# Patient Record
Sex: FEMALE | URBAN - METROPOLITAN AREA
[De-identification: names, ages, dates, MRNs, and addresses within clinical notes are randomized per-mention and may not be internally consistent; named-entity substitution may affect disease eponyms.]

---

## 2023-06-28 ENCOUNTER — APPOINTMENT (RX ONLY)
Dept: URBAN - METROPOLITAN AREA CLINIC 145 | Facility: CLINIC | Age: 83
Setting detail: DERMATOLOGY
End: 2023-06-28

## 2023-06-28 PROBLEM — C44.311 BASAL CELL CARCINOMA OF SKIN OF NOSE: Status: ACTIVE | Noted: 2023-06-28

## 2023-06-28 PROCEDURE — 99203 OFFICE O/P NEW LOW 30 MIN: CPT

## 2023-06-28 PROCEDURE — ? CONSULTATION FOR MOHS SURGERY

## 2023-06-28 NOTE — PROCEDURE: CONSULTATION FOR MOHS SURGERY
Detail Level: Detailed
Body Location Override (Optional - Billing Will Still Be Based On Selected Body Map Location If Applicable): right alar crease
Size Of Lesion: 1.6
X Size Of Lesion In Cm (Optional): 1.1
Other Plan: WE DISCUSSED WITH THE PATIENT ABOUT THIS SITE IS VERY LARGE AND WOULD REQUIRE SIGNIFICANT FLAP AND OR TWO STAGED FLAP TO GET THIS REPAIRED, WE DISCUSSED ORAL ERIVEDGE AND SHE DID NOT WANT TO DO THAT EITEHR SO RADIATION IS WHAT SHE WOULD LIKE TO DO WHICH WOULD HELP PRESERVE HER NOSTRIL AND BREATHING AS WELL
Incorporate Mauc In Note: Yes
Location Indication Override (Is Already Calculated Based On Selected Body Location): Area H

## 2023-06-28 NOTE — HPI: MOHS SURGERY CONSULTATION
Has The Cancer Been Biopsied Before?: has not been previously biopsied
Who Is Your Referring Provider?: Elton